# Patient Record
Sex: MALE | Race: ASIAN | NOT HISPANIC OR LATINO
[De-identification: names, ages, dates, MRNs, and addresses within clinical notes are randomized per-mention and may not be internally consistent; named-entity substitution may affect disease eponyms.]

---

## 2024-09-06 PROBLEM — Z00.00 ENCOUNTER FOR PREVENTIVE HEALTH EXAMINATION: Status: ACTIVE | Noted: 2024-09-06

## 2024-09-12 ENCOUNTER — APPOINTMENT (OUTPATIENT)
Dept: HEPATOLOGY | Facility: CLINIC | Age: 54
End: 2024-09-12
Payer: MEDICAID

## 2024-09-12 ENCOUNTER — APPOINTMENT (OUTPATIENT)
Dept: PRIMARY CARE | Facility: CLINIC | Age: 54
End: 2024-09-12

## 2024-09-12 ENCOUNTER — OUTPATIENT (OUTPATIENT)
Dept: OUTPATIENT SERVICES | Facility: HOSPITAL | Age: 54
LOS: 1 days | End: 2024-09-12

## 2024-09-12 VITALS
RESPIRATION RATE: 16 BRPM | OXYGEN SATURATION: 98 % | WEIGHT: 212 LBS | DIASTOLIC BLOOD PRESSURE: 84 MMHG | HEIGHT: 72 IN | BODY MASS INDEX: 28.71 KG/M2 | HEART RATE: 85 BPM | SYSTOLIC BLOOD PRESSURE: 133 MMHG | TEMPERATURE: 98.3 F

## 2024-09-12 DIAGNOSIS — B18.1 CHRONIC VIRAL HEPATITIS B W/OUT DELTA-AGENT: ICD-10-CM

## 2024-09-12 DIAGNOSIS — K76.9 LIVER DISEASE, UNSPECIFIED: ICD-10-CM

## 2024-09-12 DIAGNOSIS — K74.00 HEPATIC FIBROSIS, UNSPECIFIED: ICD-10-CM

## 2024-09-12 PROCEDURE — 99204 OFFICE O/P NEW MOD 45 MIN: CPT

## 2024-09-12 RX ORDER — TENOFOVIR DISOPROXIL FUMARATE 300 MG/1
300 TABLET ORAL DAILY
Qty: 90 | Refills: 3 | Status: ACTIVE | COMMUNITY
Start: 2024-09-12 | End: 1900-01-01

## 2024-09-13 ENCOUNTER — OUTPATIENT (OUTPATIENT)
Dept: OUTPATIENT SERVICES | Facility: HOSPITAL | Age: 54
LOS: 1 days | End: 2024-09-13

## 2024-09-13 ENCOUNTER — APPOINTMENT (OUTPATIENT)
Dept: PRIMARY CARE | Facility: CLINIC | Age: 54
End: 2024-09-13

## 2024-09-15 NOTE — HISTORY OF PRESENT ILLNESS
[FreeTextEntry1] : RFR: Hep B & Liver lesion Former Dr. De Santiago's [facility] pt () Referring from: PCP Dr. Marcella Gregory/ NP Lukefelisha Cary Townsend is a 54y male with chronic hepatitis B (Dx in his 20s) Currently on Hepsera since 2024 (from TDF 300mg due to insurance issue) who presents for the Hep B eval. Pt also has Bell palsy, HTN, Rosacea, BMI 31 and GERD.  - Pt reports experiencing nausea and abdominal discomfort since starting Hepsera in May 2024. - Pt denies any Kidney issue or bone issue.   Today he reports feeling well and no physical complaints except the GI Sx after taking Hepsera.   [Medical Hx] as above [Surgical Hx] None [Social Hx] Alcohol: Denies  Tobacco: Never illicit drug: dEnies  Herb and dietary Supplement: Vit D [[facility] of liver disease] Brother - Hep B Father  from liver cirrhosis.  [Medications] Lisinopril amlodipine Omeprazole  [Labs] 24 Na 142 K 4 Cr 0.87 TB 0.6 AST/ALT  ALP 85 Alb 4.9   wbc 7.57 Hb 15.8  A1C 5.7 HBV PCR DNA M10 IU/mL  [Imaging] MRI ad w/wo cont 24: suspicious of changes of cirrhosis. Possible NEW 6mm right lobe hepatic nodule (Seg 7), with nonspecific appearance. LI-RADS 3. f/u MRI 6months is recommended.  US abd + elastography 3/8/24: 0.8cm echogenic lesion in left hepatic lobe. Mild echogenic liver. F2-3  [Procedures] EGD  Dr rodarte: wnl  Colonoscopy  Dr. Rodarte: wnl

## 2024-09-15 NOTE — ASSESSMENT
[FreeTextEntry1] : [Assessment]   54y male with chronic hepatitis B (Dx in his 20s) Currently on Hepsera since 5/2024 (from TDF 300mg due to insurance issue)   [Plan]  # Chronic hepatitis B - Emphasized the importance of compliance with medications and HCC screening - Discussed at length with the patient that next course of action would depend on results - Pt reports experiencing nausea and abdominal discomfort since starting Hepsera in May 2024. - Ordered TDF. will work on PA asap.  - Screen Hep A and C  # Liver lesions NEW 6mm in Seg 7 LR3 lesion on MRI 8/13/24 Repeat MRI w/wo EOVIST in 4 months in early Dec 2024  Labs today for AFP and L3 Fibroscan before visit another labs before MRI (MELD labs + AFP) Imaging: MRI abd w/wo EOVIST in very early Dec 2024 MRI PA in Nov 2024 RTO in Dec 2024.

## 2024-09-15 NOTE — REVIEW OF SYSTEMS
[Fever] : no fever [Chills] : no chills [Feeling Tired] : not feeling tired [Eye Pain] : no eye pain [Earache] : no earache [Chest Pain] : no chest pain [Palpitations] : no palpitations [Shortness Of Breath] : no shortness of breath [Wheezing] : no wheezing [Cough] : no cough [Abdominal Pain] : no abdominal pain [Vomiting] : no vomiting [Constipation] : no constipation [Diarrhea] : no diarrhea [Melena] : no melena [Limb Swelling] : no limb swelling [Itching] : no itching [Dizziness] : no dizziness [Fainting] : no fainting [Anxiety] : no anxiety [Easy Bleeding] : no tendency for easy bleeding [Easy Bruising] : no tendency for easy bruising

## 2024-09-15 NOTE — HISTORY OF PRESENT ILLNESS
[FreeTextEntry1] : RFR: Hep B & Liver lesion Former Dr. De Santiago's [facility] pt () Referring from: PCP Dr. Marcella Gregory/ NP Lukefelisha Cary Townsend is a 54y male with chronic hepatitis B (Dx in his 20s) Currently on Hepsera since 2024 (from TDF 300mg due to insurance issue) who presents for the Hep B eval. Pt also has Bell palsy, HTN, Rosacea, BMI 31 and GERD.  - Pt reports experiencing nausea and abdominal discomfort since starting Hepsera in May 2024. - Pt denies any Kidney issue or bone issue.   Today he reports feeling well and no physical complaints except the GI Sx after taking Hepsera.   [Medical Hx] as above [Surgical Hx] None [Social Hx] Alcohol: Denies  Tobacco: Never illicit drug: dEnies  Herb and dietary Supplement: Vit D [[facility] of liver disease] Brother - Hep B Father  from liver cirrhosis.  [Medications] Lisinopril amlodipine Omeprazole  [Labs] 24 Na 142 K 4 Cr 0.87 TB 0.6 AST/ALT  ALP 85 Alb 4.9   wbc 7.57 Hb 15.8  A1C 5.7 HBV PCR DNA M10 IU/mL  [Imaging] MRI ad w/wo cont 24: suspicious of changes of cirrhosis. Possible NEW 6mm right lobe hepatic nodule (Seg 7), with nonspecific appearance. LI-RADS 3. f/u MRI 6months is recommended.  US abd + elastography 3/8/24: 0.8cm echogenic lesion in left hepatic lobe. Mild echogenic liver. F2-3  [Procedures] EGD  Dr rodarte: wnl  Colonoscopy  Dr. Rodarte: wnl

## 2024-09-15 NOTE — PHYSICAL EXAM
[Scleral Icterus] : No Scleral Icterus [Hepatojugular Reflux] : patient did not have a sustained hepatojugular reflux [Spider Angioma] : No spider angioma(s) were observed [Abdominal  Ascites] : no ascites [Splenomegaly] : no splenomegaly [Non-Tender] : non-tender [Smooth] : smooth [Asterixis] : no asterixis observed [Jaundice] : No jaundice [Palmar Erythema] : no Palmar Erythema [General Appearance - Alert] : alert [General Appearance - In No Acute Distress] : in no acute distress [General Appearance - Well-Appearing] : healthy appearing [Sclera] : the sclera and conjunctiva were normal [Outer Ear] : the ears and nose were normal in appearance [] : no respiratory distress [Murmurs] : no murmurs [Edema] : there was no peripheral edema [Abdomen Tenderness] : non-tender [Abdomen Hernia] : no hernia was discovered [No CVA Tenderness] : no ~M costovertebral angle tenderness [Abnormal Walk] : normal gait [Skin Color & Pigmentation] : normal skin color and pigmentation [Oriented To Time, Place, And Person] : oriented to person, place, and time

## 2024-09-18 LAB
ALPHA-1-FETOPROTEIN-L3: NORMAL %
ALPHA-1-FETOPROTEIN: 2.5 NG/ML

## 2024-12-19 ENCOUNTER — APPOINTMENT (OUTPATIENT)
Dept: HEPATOLOGY | Facility: CLINIC | Age: 54
End: 2024-12-19

## 2024-12-19 PROCEDURE — 76981 USE PARENCHYMA: CPT

## 2025-01-30 ENCOUNTER — APPOINTMENT (OUTPATIENT)
Dept: HEPATOLOGY | Facility: CLINIC | Age: 55
End: 2025-01-30
Payer: MEDICAID

## 2025-01-30 VITALS
DIASTOLIC BLOOD PRESSURE: 79 MMHG | HEIGHT: 72 IN | TEMPERATURE: 98.2 F | SYSTOLIC BLOOD PRESSURE: 124 MMHG | RESPIRATION RATE: 16 BRPM | WEIGHT: 211 LBS | BODY MASS INDEX: 28.58 KG/M2 | HEART RATE: 71 BPM | OXYGEN SATURATION: 98 %

## 2025-01-30 DIAGNOSIS — K76.9 LIVER DISEASE, UNSPECIFIED: ICD-10-CM

## 2025-01-30 DIAGNOSIS — B18.1 CHRONIC VIRAL HEPATITIS B W/OUT DELTA-AGENT: ICD-10-CM

## 2025-01-30 DIAGNOSIS — K74.00 HEPATIC FIBROSIS, UNSPECIFIED: ICD-10-CM

## 2025-01-30 LAB
AFP-TM SERPL-MCNC: 2.9 NG/ML
ALBUMIN SERPL ELPH-MCNC: 5 G/DL
ALP BLD-CCNC: 96 U/L
ALT SERPL-CCNC: 26 U/L
ANION GAP SERPL CALC-SCNC: 13 MMOL/L
AST SERPL-CCNC: 25 U/L
BILIRUB SERPL-MCNC: 0.6 MG/DL
BUN SERPL-MCNC: 16 MG/DL
CALCIUM SERPL-MCNC: 9.6 MG/DL
CHLORIDE SERPL-SCNC: 105 MMOL/L
CO2 SERPL-SCNC: 24 MMOL/L
CREAT SERPL-MCNC: 0.84 MG/DL
EGFR: 102 ML/MIN/1.73M2
GLUCOSE SERPL-MCNC: 110 MG/DL
POTASSIUM SERPL-SCNC: 4.4 MMOL/L
PROT SERPL-MCNC: 7.9 G/DL
SODIUM SERPL-SCNC: 142 MMOL/L

## 2025-01-30 PROCEDURE — 99214 OFFICE O/P EST MOD 30 MIN: CPT

## 2025-01-31 LAB
BASOPHILS # BLD AUTO: 0.04 K/UL
BASOPHILS NFR BLD AUTO: 0.5 %
EOSINOPHIL # BLD AUTO: 0.1 K/UL
EOSINOPHIL NFR BLD AUTO: 1.4 %
HCT VFR BLD CALC: 47.8 %
HEPB DNA PCR INT: NOT DETECTED
HEPB DNA PCR LOG: NOT DETECTED LOGIU/ML
HGB BLD-MCNC: 15.8 G/DL
IMM GRANULOCYTES NFR BLD AUTO: 0.4 %
LYMPHOCYTES # BLD AUTO: 2.44 K/UL
LYMPHOCYTES NFR BLD AUTO: 33.2 %
MAN DIFF?: NORMAL
MCHC RBC-ENTMCNC: 31 PG
MCHC RBC-ENTMCNC: 33.1 G/DL
MCV RBC AUTO: 93.7 FL
MONOCYTES # BLD AUTO: 0.64 K/UL
MONOCYTES NFR BLD AUTO: 8.7 %
NEUTROPHILS # BLD AUTO: 4.11 K/UL
NEUTROPHILS NFR BLD AUTO: 55.8 %
PLATELET # BLD AUTO: 199 K/UL
RBC # BLD: 5.1 M/UL
RBC # FLD: 13.2 %
WBC # FLD AUTO: 7.36 K/UL

## 2025-03-06 ENCOUNTER — APPOINTMENT (OUTPATIENT)
Dept: MRI IMAGING | Facility: CLINIC | Age: 55
End: 2025-03-06

## 2025-03-06 ENCOUNTER — OUTPATIENT (OUTPATIENT)
Dept: OUTPATIENT SERVICES | Facility: HOSPITAL | Age: 55
LOS: 1 days | End: 2025-03-06
Payer: MEDICAID

## 2025-03-06 DIAGNOSIS — K74.00 HEPATIC FIBROSIS, UNSPECIFIED: ICD-10-CM

## 2025-03-06 DIAGNOSIS — K76.9 LIVER DISEASE, UNSPECIFIED: ICD-10-CM

## 2025-03-06 DIAGNOSIS — B18.1 CHRONIC VIRAL HEPATITIS B WITHOUT DELTA-AGENT: ICD-10-CM

## 2025-03-06 PROCEDURE — 74183 MRI ABD W/O CNTR FLWD CNTR: CPT

## 2025-03-06 PROCEDURE — A9581: CPT

## 2025-03-06 PROCEDURE — 74183 MRI ABD W/O CNTR FLWD CNTR: CPT | Mod: 26

## 2025-08-25 ENCOUNTER — APPOINTMENT (OUTPATIENT)
Dept: HEPATOLOGY | Facility: CLINIC | Age: 55
End: 2025-08-25
Payer: MEDICAID

## 2025-08-25 ENCOUNTER — APPOINTMENT (OUTPATIENT)
Dept: HEPATOLOGY | Facility: CLINIC | Age: 55
End: 2025-08-25

## 2025-08-25 VITALS
DIASTOLIC BLOOD PRESSURE: 79 MMHG | SYSTOLIC BLOOD PRESSURE: 115 MMHG | BODY MASS INDEX: 27.63 KG/M2 | RESPIRATION RATE: 16 BRPM | OXYGEN SATURATION: 94 % | HEART RATE: 75 BPM | WEIGHT: 204 LBS | TEMPERATURE: 98.4 F | HEIGHT: 72 IN

## 2025-08-25 DIAGNOSIS — K76.9 LIVER DISEASE, UNSPECIFIED: ICD-10-CM

## 2025-08-25 DIAGNOSIS — K74.00 HEPATIC FIBROSIS, UNSPECIFIED: ICD-10-CM

## 2025-08-25 DIAGNOSIS — B18.1 CHRONIC VIRAL HEPATITIS B W/OUT DELTA-AGENT: ICD-10-CM

## 2025-08-25 PROCEDURE — 99214 OFFICE O/P EST MOD 30 MIN: CPT

## 2025-09-02 ENCOUNTER — APPOINTMENT (OUTPATIENT)
Dept: INTERNAL MEDICINE | Facility: CLINIC | Age: 55
End: 2025-09-02

## 2025-09-02 ENCOUNTER — OUTPATIENT (OUTPATIENT)
Dept: OUTPATIENT SERVICES | Facility: HOSPITAL | Age: 55
LOS: 1 days | End: 2025-09-02